# Patient Record
Sex: FEMALE | Race: WHITE | Employment: UNEMPLOYED | URBAN - METROPOLITAN AREA
[De-identification: names, ages, dates, MRNs, and addresses within clinical notes are randomized per-mention and may not be internally consistent; named-entity substitution may affect disease eponyms.]

---

## 2021-03-26 ENCOUNTER — IMMUNIZATION (OUTPATIENT)
Dept: PRIMARY CARE CLINIC | Age: 21
End: 2021-03-26
Payer: OTHER GOVERNMENT

## 2021-03-26 PROCEDURE — 0011A COVID-19, MODERNA VACCINE 100MCG/0.5ML DOSE: CPT | Performed by: PHYSICIAN ASSISTANT

## 2021-03-26 PROCEDURE — 91301 COVID-19, MODERNA VACCINE 100MCG/0.5ML DOSE: CPT | Performed by: PHYSICIAN ASSISTANT

## 2021-04-23 ENCOUNTER — IMMUNIZATION (OUTPATIENT)
Dept: PRIMARY CARE CLINIC | Age: 21
End: 2021-04-23
Payer: OTHER GOVERNMENT

## 2021-04-23 PROCEDURE — 91301 COVID-19, MODERNA VACCINE 100MCG/0.5ML DOSE: CPT | Performed by: PHYSICIAN ASSISTANT

## 2021-04-23 PROCEDURE — 0012A COVID-19, MODERNA VACCINE 100MCG/0.5ML DOSE: CPT | Performed by: PHYSICIAN ASSISTANT

## 2022-06-19 ENCOUNTER — APPOINTMENT (OUTPATIENT)
Dept: GENERAL RADIOLOGY | Age: 22
End: 2022-06-19
Payer: OTHER GOVERNMENT

## 2022-06-19 ENCOUNTER — HOSPITAL ENCOUNTER (EMERGENCY)
Age: 22
Discharge: HOME | End: 2022-06-19
Payer: OTHER GOVERNMENT

## 2022-06-19 VITALS
HEART RATE: 62 BPM | WEIGHT: 147 LBS | TEMPERATURE: 97.9 F | SYSTOLIC BLOOD PRESSURE: 115 MMHG | OXYGEN SATURATION: 99 % | DIASTOLIC BLOOD PRESSURE: 70 MMHG | BODY MASS INDEX: 23.07 KG/M2 | HEIGHT: 67 IN | RESPIRATION RATE: 14 BRPM

## 2022-06-19 DIAGNOSIS — S93.402A SPRAIN OF LEFT ANKLE, UNSPECIFIED LIGAMENT, INITIAL ENCOUNTER: Primary | ICD-10-CM

## 2022-06-19 PROCEDURE — 73610 X-RAY EXAM OF ANKLE: CPT

## 2022-06-19 PROCEDURE — 99283 EMERGENCY DEPT VISIT LOW MDM: CPT

## 2022-06-19 PROCEDURE — 73630 X-RAY EXAM OF FOOT: CPT

## 2022-06-19 RX ORDER — IBUPROFEN 600 MG/1
600 TABLET ORAL EVERY 6 HOURS PRN
Qty: 28 TABLET | Refills: 0 | Status: SHIPPED | OUTPATIENT
Start: 2022-06-19 | End: 2022-07-09

## 2022-06-19 NOTE — ED PROVIDER NOTES
[address] [place] [address]  Department of Emergency Medicine   ED  Encounter Note  Admit Date/RoomTime: 2022  2:07 PM  ED Room:     NAME: Patricia Mei  : 2000  MRN: [id number]     Chief Complaint:  Ankle Pain (left ankle pain, \"rolled it\" this morning)    History of Present Illness         Patricia Mei is a 25 y.o. old female presenting to the emergency department by private vehicle, for non-traumatic Left ankle pain which occured 1 hour(s) prior to arrival.  The complaint is due to a twisting injury while at home. Since onset the symptoms have been stable with ability to bear weight, but with some pain. Patient has no prior history of pain/injury with regards to today's visit. Her pain is aggraveated by any movement and relieved by nothing, as no treatment has been provided prior to this visit. She denies any head injury, headache, loss of consciousness, confusion, dizziness, neck pain, chest pain, abdominal pain, back pain, numbness, weakness, blurred vision, nausea, vomiting, fever, chills, wounds or rash. Tetanus Status: up to date. ROS   Pertinent positives and negatives are stated within HPI, all other systems reviewed and are negative. Past Medical History:  has no past medical history on file. Surgical History:  has a past surgical history that includes Tonsillectomy and orthopedic surgery. Social History:  reports that she has never smoked. She does not have any smokeless tobacco history on file. She reports that she does not drink alcohol and does not use drugs. Family History: family history is not on file. Allergies: Other    Physical Exam   Oxygen Saturation Interpretation: Normal.        ED Triage Vitals [22 1400]   BP Temp Temp src Heart Rate Resp SpO2 Height Weight   115/70 97.9 °F (36.6 °C) -- 62 14 99 % 5' 7\" (1.702 m) 147 lb (66.7 kg)         Constitutional:  Alert, development consistent with age. Neck:  Normal ROM. Supple. Left Ankle: Lateral malleolus              Tenderness:  mild. Swelling: Mild. Deformity: no deformity observed/palpated. ROM: full range of motion. Skin: Moderate swelling noted. No evidence of any wounds. No obvious deformity. Patient is good DP/PT pulse       Neurovascular: Motor deficit: none. Sensory deficit:   none. Pulse deficit: none. Capillary refill: normal.  Left Knee:              Tenderness:  none. Swelling: None. Deformity: no deformity observed/palpated. ROM: full range of motion. Skin:  no wounds, erythema, or swelling. Left Foot: fifth metatarsal dorsal and plantar aspect midshaft              Tenderness:  mild. Swelling: None. Deformity: no deformity observed/palpated. ROM: full range of motion with pain. Skin:  no wounds, erythema, or swelling  Gait:  normal.   Lymphatics: No lymphangitis or adenopathy noted. Neurological:  Oriented. Motor functions intact. Lab / Imaging Results   (All laboratory and radiology results have been personally reviewed by myself)  Labs:  No results found for this visit on 06/19/22. Imaging: All Radiology results interpreted by Radiologist unless otherwise noted. XR ANKLE LEFT (MIN 3 VIEWS)   Final Result   No acute osseous or soft tissue findings seen about the left ankle nor left   foot on these exams. Normal alignment. RECOMMENDATION:   In the setting of trauma, if there is persistent symptoms and physical exam   warrants a repeat radiograph in 10-14 days could be considered as occult   fractures may not be evident on initial imaging evaluation. XR FOOT LEFT (MIN 3 VIEWS)   Final Result   No acute osseous or soft tissue findings seen about the left ankle nor left   foot on these exams. Normal alignment.       RECOMMENDATION:   In the time and are agreeable with the plan. Assessment      1. Sprain of left ankle, unspecified ligament, initial encounter      Plan   Discharged home. Patient condition is stable    New Medications     New Prescriptions    IBUPROFEN (ADVIL;MOTRIN) 600 MG TABLET    Take 1 tablet by mouth every 6 hours as needed for Pain     Electronically signed by Marcial Garcia PA-C   DD: 6/19/22  **This report was transcribed using voice recognition software. Every effort was made to ensure accuracy; however, inadvertent computerized transcription errors may be present.   END OF ED PROVIDER NOTE        Marcial Garcia PA-C  06/19/22 [id number]

## 2022-06-20 ENCOUNTER — TELEPHONE (OUTPATIENT)
Dept: PRIMARY CARE CLINIC | Age: 22
End: 2022-06-20

## 2022-06-20 NOTE — TELEPHONE ENCOUNTER
Wants to establish with you as a new patient-will you take her?          ----- Message from Frederick Cassidy sent at 6/20/2022 11:58 AM EDT -----  Subject: Appointment Request    Reason for Call: Routine ED Follow Up Visit    QUESTIONS  Type of Appointment? New Patient/New to Provider  Reason for appointment request? No appointments available during search  Additional Information for Provider? parent of patient are patients of Dr. Anne Marie Ching and the daughter would like to be a patient also in the same   office. Please call and let them know if this is something that can be   done.  ---------------------------------------------------------------------------  --------------  CALL BACK INFO  What is the best way for the office to contact you? OK to leave message on   voicemail  Preferred Call Back Phone Number? [phone]  ---------------------------------------------------------------------------  --------------  SCRIPT ANSWERS  Relationship to Patient? Parent  Representative Name? Mom / Sergei Headley  Additional information verified (besides Name and Date of Birth)? Address  Specialty Confirmation? Primary Care  (Patient requests to see provider urgently. )? No  Do you have any questions for your primary care provider that need to be   answered prior to your appointment? No  Have you been diagnosed with COVID-19 in the past 10 days? No  (Service Expert - click yes below to proceed with [employer] As Usual   Scheduling)?  Yes

## 2022-07-09 ENCOUNTER — OFFICE VISIT (OUTPATIENT)
Dept: PRIMARY CARE CLINIC | Age: 22
End: 2022-07-09
Payer: OTHER GOVERNMENT

## 2022-07-09 DIAGNOSIS — Z00.01 ENCOUNTER FOR ANNUAL GENERAL MEDICAL EXAMINATION WITH ABNORMAL FINDINGS IN ADULT: Primary | ICD-10-CM

## 2022-07-09 DIAGNOSIS — L70.8 OTHER ACNE: ICD-10-CM

## 2022-07-09 PROCEDURE — 99385 PREV VISIT NEW AGE 18-39: CPT | Performed by: FAMILY MEDICINE

## 2022-07-09 NOTE — PROGRESS NOTES
22  Name: Dell Arora    : 2000    Sex: female    Age: 25 y.o. Subjective:  Chief Complaint: Patient is here for  [facility] as  primary care. Patient referred by her parents. She has been seeing pediatrician. She feels well. She has acne and is in on Accutane. History is positive for anxiety. Sees a counselor but not any medication. Mild scoliosis  Ileal esophagitis from use of Flonase      Surgical history is positive for fracture left elbow with plans for fourth grade. EGD       Social history  Non-smoker  Lives with her parents  No siblings  IUD  She is GYN on a regular basis  Can see [facility]      Review of Systems   Constitutional: Negative. HENT: Negative. Eyes: Negative. Respiratory: Negative. Cardiovascular: Negative. Gastrointestinal: Negative. Endocrine: Negative. Genitourinary: Negative. Musculoskeletal: Negative. Skin: Negative. Allergic/Immunologic: Negative. Neurological: Negative. Hematological: Negative. Psychiatric/Behavioral: Negative. No current outpatient medications on file.   Allergies   Allergen Reactions    Other Anaphylaxis     Peanuts and tree nuts     Social History     Socioeconomic History    Marital status: Single     Spouse name: Not on file    Number of children: Not on file    Years of education: Not on file    Highest education level: Not on file   Occupational History    Not on file   Tobacco Use    Smoking status: Never Smoker    Smokeless tobacco: Never Used   Vaping Use    Vaping Use: Never used   Substance and Sexual Activity    Alcohol use: No    Drug use: No    Sexual activity: Not on file   Other Topics Concern    Not on file   Social History Narrative    Referred by parents Bonilla Bees and Nik Slight    Acne on Accutane since 3-22    IUD     Candida esophagitis from Flonase with EGD done     Anxiety sees a counselor with no meds    Right earwax     Mild scoliosis                12/15/2016 4:26 PM EST      IMPRESSION: There is a 9 degree levocurvature of the thoracolumbar spine centered at the T11     level. Measurements were made at the T7 and L2 levels. Previously it measured 13 degrees. No vertebral segmentation anomalies are seen. The right shoulder and hip are slightly higher     than the left. No acute pathology is seen in the rest of the exam.        **This report has been created using voice recognition software. It may contain minor errors     which are inherent in voice recognition technology**     Social Determinants of Health     Financial Resource Strain:     Difficulty of Paying Living Expenses: Not on file   Food Insecurity:     Worried About Running Out of Food in the Last Year: Not on file    Rubén of Food in the Last Year: Not on file   Transportation Needs:     Lack of Transportation (Medical): Not on file    Lack of Transportation (Non-Medical): Not on file   Physical Activity:     Days of Exercise per Week: Not on file    Minutes of Exercise per Session: Not on file   Stress:     Feeling of Stress : Not on file   Social Connections:     Frequency of Communication with Friends and Family: Not on file    Frequency of Social Gatherings with Friends and Family: Not on file    Attends [employer] Services: Not on file    Active Member of [address] [place] [address] [employer] or Organizations: Not on file    Attends Club or Organization Meetings: Not on file    Marital Status: Not on file   Intimate Partner Violence:     Fear of Current or Ex-Partner: Not on file    Emotionally Abused: Not on file    Physically Abused: Not on file    Sexually Abused: Not on file   Housing Stability:     Unable to Pay for Housing in the Last Year: Not on file    Number of [place] in the Last Year: Not on file    Unstable Housing in the Last Year: Not on file      No past medical history on file. No family history on file.    Past Surgical History:   Procedure Laterality Date    ORTHOPEDIC SURGERY      pin right arm, fracture repair    TONSILLECTOMY      adnoids      Vitals:    07/09/22 0854   BP: 124/68   Temp: 98.5 °F (36.9 °C)   TempSrc: Oral   Weight: 150 lb (68 kg)   Height: 5' 7\" (1.702 m)       Objective:    Physical Exam  Vitals reviewed. Constitutional:       Appearance: She is well-developed. HENT:      Head: Normocephalic. Ears:      Comments: lg amt right earwax very hard  Eyes:      Pupils: Pupils are equal, round, and reactive to light. Cardiovascular:      Rate and Rhythm: Normal rate and regular rhythm. Pulmonary:      Effort: Pulmonary effort is normal.      Breath sounds: Normal breath sounds. Abdominal:      Palpations: Abdomen is soft. Musculoskeletal:         General: Normal range of motion. Cervical back: Normal range of motion. Skin:     General: Skin is warm. Neurological:      Mental Status: She is alert and oriented to person, place, and time. Psychiatric:         Behavior: Behavior normal.         Anastasia Aldridge was seen today for establish care. Diagnoses and all orders for this visit:    Encounter for annual general medical examination with abnormal findings in adult  -     CBC with Auto Differential; Future  -     Comprehensive Metabolic Panel; Future  -     Lipid Panel; Future  -     TSH; Future  -     Urinalysis; Future  -     CBC with Auto Differential; Future  -     Comprehensive Metabolic Panel; Future  -     Lipid Panel; Future  -     Urinalysis; Future    Other acne        Comments: Instructed her and written down instructions for eardrops right ear nightly for 3 and then flush the next morning if unable to get out to see me. Continue with psychology. Laboratory studies and call afterwards. A great deal of time was spent reviewing records and establishing treatment protocol and treatment plan. The majority of the time was spent on face-to-face exam and education.        I educated the patient about all medications. Make sure they were correct and educated  on the risk associated with missing meds or taking them incorrectly. A list of medications is being sent home with patient today. I informed patient about the risk associated with noncompliance of medication and taking medications incorrectly. Appropriate follow-up with myself and all specialist.  Encourage family members to take active role in assisting with medications and medical care. If any confusion should develop to notify my office immediately to avoid risk of worsening medical condition    A great deal of time spent reviewing medications, diet, exercise, social issues. Also reviewing the chart before entering the room with patient and finishing charting after leaving patient's room. More than half of that time was spent face to face with the patient in counseling and coordinating care. Follow Up: No follow-ups on file.      Seen by:  Jing Barlow,

## 2022-07-11 VITALS
DIASTOLIC BLOOD PRESSURE: 68 MMHG | BODY MASS INDEX: 23.54 KG/M2 | TEMPERATURE: 98.5 F | HEIGHT: 67 IN | SYSTOLIC BLOOD PRESSURE: 124 MMHG | WEIGHT: 150 LBS

## 2022-07-11 PROBLEM — L70.8 OTHER ACNE: Status: ACTIVE | Noted: 2022-07-11

## 2022-07-11 ASSESSMENT — ENCOUNTER SYMPTOMS
RESPIRATORY NEGATIVE: 1
EYES NEGATIVE: 1
GASTROINTESTINAL NEGATIVE: 1
ALLERGIC/IMMUNOLOGIC NEGATIVE: 1

## 2022-07-11 ASSESSMENT — PATIENT HEALTH QUESTIONNAIRE - PHQ9
SUM OF ALL RESPONSES TO PHQ9 QUESTIONS 1 & 2: 0
2. FEELING DOWN, DEPRESSED OR HOPELESS: 0
SUM OF ALL RESPONSES TO PHQ QUESTIONS 1-9: 0
1. LITTLE INTEREST OR PLEASURE IN DOING THINGS: 0
SUM OF ALL RESPONSES TO PHQ QUESTIONS 1-9: 0